# Patient Record
Sex: FEMALE | Race: WHITE
[De-identification: names, ages, dates, MRNs, and addresses within clinical notes are randomized per-mention and may not be internally consistent; named-entity substitution may affect disease eponyms.]

---

## 2019-08-12 ENCOUNTER — HOSPITAL ENCOUNTER (EMERGENCY)
Dept: HOSPITAL 91 - E/R | Age: 54
Discharge: HOME | End: 2019-08-12
Payer: COMMERCIAL

## 2019-08-12 ENCOUNTER — HOSPITAL ENCOUNTER (EMERGENCY)
Dept: HOSPITAL 10 - E/R | Age: 54
Discharge: HOME | End: 2019-08-12
Payer: COMMERCIAL

## 2019-08-12 VITALS
BODY MASS INDEX: 25.62 KG/M2 | HEIGHT: 63 IN | WEIGHT: 144.62 LBS | HEIGHT: 63 IN | WEIGHT: 144.62 LBS | BODY MASS INDEX: 25.62 KG/M2

## 2019-08-12 VITALS — HEART RATE: 56 BPM | DIASTOLIC BLOOD PRESSURE: 80 MMHG | SYSTOLIC BLOOD PRESSURE: 115 MMHG | RESPIRATION RATE: 18 BRPM

## 2019-08-12 DIAGNOSIS — M54.41: ICD-10-CM

## 2019-08-12 DIAGNOSIS — M54.42: Primary | ICD-10-CM

## 2019-08-12 PROCEDURE — 72100 X-RAY EXAM L-S SPINE 2/3 VWS: CPT

## 2019-08-12 PROCEDURE — 99283 EMERGENCY DEPT VISIT LOW MDM: CPT

## 2019-08-12 NOTE — ERD
ER Documentation


Chief Complaint


Chief Complaint





LOW BACK PAIN X 1 MONTH RADIATING TO B/L LEGS





HPI


53-year-old female with no significant past medical history presents with low 


back pain x1 month.  Present back issues for about 2 years.  States that the 


current back pain is noted to be 6 out of 10, described as a dull sensation, 


with radiation to her bilateral legs.  She took some ibuprofen with only mild 


relief.  States never had an x-ray done of her back.  Denies fever.  Denies 


chest pain or shortness of breath.  Denies abdominal pain, nausea, vomiting.  No


other modifying factors noted, no other treatments tried at home.  No recent 


back procedures noted.  No recent infection.  No weight loss noted.  Denies 


history of cancer.





ROS


All systems reviewed and are negative except as per history of present illness.





Medications


Home Meds


Active Scripts


Acetaminophen* (Acetaminophen*) 500 MG Extra Strength Tablet, 500 MG PO Q4H PRN 


for PAIN AND OR ELEVATED TEMP, #30 TAB


   Prov:HONEY ROBERSON DO         8/12/19


Reported Medications


Omeprazole* (Omeprazole*) 40 Mg Capsule.dr, 40 MG PO DAILY


   8/19/13





Allergies


Allergies:  


Coded Allergies:  


     Codeine (Verified  Allergy, Mild, 8/19/13)


     morphine (Verified  Allergy, Mild, ITCHY, 8/19/13)





PMhx/Soc


History of Surgery:  Yes (HYSTERECTOMY, TUMMY TUCK)


Anesthesia Reaction:  No


Hx Neurological Disorder:  No


Hx Respiratory Disorders:  No


Hx Cardiac Disorders:  No


Hx Psychiatric Problems:  No


Hx Miscellaneous Medical Probl:  No


Hx Alcohol Use:  No


Hx Substance Use:  No


Hx Tobacco Use:  No





FmHx


Family History:  No coronary disease





Physical Exam


Vitals


Vital Signs


  Date      Temp  Pulse  Resp  B/P (MAP)   Pulse Ox  O2          O2 Flow    FiO2


Time                                                 Delivery    Rate


   8/12/19  98.4     56    18      115/80        98


     15:55                           (92)





Physical Exam


Const:   No acute distress


Neck:               Full range of motion. No meningismus. no midline tenderness


Resp:   Clear to auscultation bilaterally


Cardio:   Regular rate and rhythm, no murmurs, bilateral radial and dorsalis 


pedis pulses intact and equal


Abd:    Soft, non tender, non distended. Normal bowel sounds, no abdominal bruit


noted


Skin:   No petechiae or rashes


Back:   no point tenderness, bilateral paravertebral muscle tenderness palpation


of the lumbar spine.  No midline tenderness.


Ext:    No cyanosis, or edema, 5/5 muscle strength bilateral upper and lower 


extremities


Neur:   Awake and alert, bilateral upper and lower extremity sensation intact


Psych:    Normal Mood and Affect





Procedures/MDM


Medical Decision Making:





Differential diagnosis includes but not limited to muscle strain, ligamentous 


sprain, epidural abscess, osteomyelitis, osteoarthritis, herniated disc, 


compression fracture, aortic aneurysm, kidney stone, pyelonephritis, 


pancreatitis.





Patient appeared well on physical examination. Nontoxic appearing.





Patient appeared well on physical examination. Nontoxic appearing.


No recent back procedure, therefore low suspicion for epidural abscess


No recent infection, therefore low suspicion for osteomyelitis


No trauma, therefore low suspicion for fracture


No chest pain, abdominal pain and no pulse deficits noted, therefore low 


suspicion for aortic dissection or pancreatitis


No flank pain or fever to suggest pyelonephritis or kidney stone





X-ray LS-Spine 3V Interpreted by me: 


Bones:    No fracture, or lytic lesions


Joints:    No dislocation


Foreign body:    None





There is noted to be a grade 1 anterior listhesis of the L5-S1 per radiology.  


Also noted a mild disc space narrowing with some endplate sclerosis of L5-S1.








Prescription(s):


Patient given prescription for supportive medication(s).





Patient advised that if her symptoms persist that she may need to see her 


primary care physician for referral to physical therapy.





Patient advised to follow up with PCP in 1-2 days. Patient advised to return to 


ED for new or worsening symptoms. Patient stable on discharge from the ED.








Disclaimer: Inadvertent spelling and grammatical errors are likely due to 


EHR/dictation software use and do not reflect on the overall quality of patient 


care. Also, please note that the electronic time recorded on this note does not 


necessarily reflect the actual time of the patient encounter.





Departure


Diagnosis:  


   Primary Impression:  


   Back pain


   Back pain location:  low back pain  Chronicity:  unspecified  Back pain 


   laterality:  unspecified  Sciatica presence:  with sciatica  Sciatica 


   laterality:  bilateral sciatica  Qualified Codes:  M54.42 - Lumbago with 


   sciatica, left side; M54.41 - Lumbago with sciatica, right side


Condition:  Fair


Patient Instructions:  Back Pain (Acute Or Chronic)


Referrals:  


[facility]


YOU HAVE RECEIVED A MEDICAL SCREENING EXAM AND THE RESULTS INDICATE THAT YOU DO 


NOT HAVE A CONDITION THAT REQUIRES URGENT TREATMENT IN THE EMERGENCY DEPARTMENT.





FURTHER EVALUATION AND TREATMENT OF YOUR CONDITION CAN WAIT UNTIL YOU ARE SEEN 


IN YOUR DOCTORS OFFICE WITHIN THE NEXT 1-2 DAYS. IT IS YOUR RESPONSIBILITY TO 


MAKE AN APPOINTMENT FOR FOLOW-UP CARE.





IF YOU HAVE A PRIMARY DOCTOR


--you should call your primary doctor and schedule an appointment





IF YOU DO NOT HAVE A PRIMARY DOCTOR YOU CAN CALL OUR PHYSICIAN REFERRAL HOTLINE 


AT


 [phone] 





IF YOU CAN NOT AFFORD TO SEE A PHYSICIAN YOU CAN CHOSE FROM THE FOLLOWING 


[facility]





[facility] [phone] [address] [facility] [phone] [address] [facility]. [facility] [phone] [address] [facility] [phone] [address] [facility]. [facility] [phone] [address] [facility]. [facility]. [phone] [address]





Additional Instructions:  


Call your primary care doctor TOMORROW for an appointment during the next 1-2 


days.See the doctor sooner or return here if your condition worsens before your 


appointment time.





Recommend warm compression 2-3 times daily, 5-10 minutes each time.











HONEY ROBERSON DO                 Aug 12, 2019 19:46

## 2019-08-26 ENCOUNTER — HOSPITAL ENCOUNTER (EMERGENCY)
Dept: HOSPITAL 91 - FTE | Age: 54
Discharge: HOME | End: 2019-08-26
Payer: COMMERCIAL

## 2019-08-26 ENCOUNTER — HOSPITAL ENCOUNTER (EMERGENCY)
Dept: HOSPITAL 10 - FTE | Age: 54
Discharge: HOME | End: 2019-08-26
Payer: COMMERCIAL

## 2019-08-26 VITALS
BODY MASS INDEX: 27.47 KG/M2 | WEIGHT: 145.51 LBS | HEIGHT: 61 IN | HEIGHT: 61 IN | WEIGHT: 145.51 LBS | BODY MASS INDEX: 27.47 KG/M2

## 2019-08-26 VITALS — RESPIRATION RATE: 16 BRPM | SYSTOLIC BLOOD PRESSURE: 116 MMHG | HEART RATE: 63 BPM | DIASTOLIC BLOOD PRESSURE: 73 MMHG

## 2019-08-26 DIAGNOSIS — M54.9: Primary | ICD-10-CM

## 2019-08-26 PROCEDURE — 96372 THER/PROPH/DIAG INJ SC/IM: CPT

## 2019-08-26 PROCEDURE — 81025 URINE PREGNANCY TEST: CPT

## 2019-08-26 PROCEDURE — 99284 EMERGENCY DEPT VISIT MOD MDM: CPT

## 2019-08-26 RX ADMIN — KETOROLAC TROMETHAMINE 1 MG: 30 INJECTION, SOLUTION INTRAMUSCULAR at 19:11
